# Patient Record
Sex: MALE | Race: WHITE | URBAN - METROPOLITAN AREA
[De-identification: names, ages, dates, MRNs, and addresses within clinical notes are randomized per-mention and may not be internally consistent; named-entity substitution may affect disease eponyms.]

---

## 2018-09-06 ENCOUNTER — TRANSFERRED RECORDS (OUTPATIENT)
Dept: HEALTH INFORMATION MANAGEMENT | Facility: CLINIC | Age: 83
End: 2018-09-06

## 2019-04-03 ENCOUNTER — TRANSFERRED RECORDS (OUTPATIENT)
Dept: HEALTH INFORMATION MANAGEMENT | Facility: CLINIC | Age: 84
End: 2019-04-03

## 2019-04-06 ENCOUNTER — TRANSFERRED RECORDS (OUTPATIENT)
Dept: HEALTH INFORMATION MANAGEMENT | Facility: CLINIC | Age: 84
End: 2019-04-06

## 2019-05-13 ENCOUNTER — TRANSFERRED RECORDS (OUTPATIENT)
Dept: HEALTH INFORMATION MANAGEMENT | Facility: CLINIC | Age: 84
End: 2019-05-13

## 2019-05-17 ENCOUNTER — TRANSFERRED RECORDS (OUTPATIENT)
Dept: HEALTH INFORMATION MANAGEMENT | Facility: CLINIC | Age: 84
End: 2019-05-17

## 2019-05-18 ENCOUNTER — TRANSFERRED RECORDS (OUTPATIENT)
Dept: HEALTH INFORMATION MANAGEMENT | Facility: CLINIC | Age: 84
End: 2019-05-18

## 2019-05-22 ENCOUNTER — REFERRAL (OUTPATIENT)
Dept: TRANSPLANT | Facility: CLINIC | Age: 84
End: 2019-05-22

## 2019-05-22 NOTE — LETTER
Request for Records from Referring Providers Office for Patients Referred to [facility] Solid Organ Transplant Program    May 22, 2019    Re: Harmeet Germain   [address]  [id number]                 [facility] [address]   5/26/1926           MEDICAL RECORDS REQUEST  [facility] liver transplant team is requesting records from Referring Providers Office for patients referred to the Liver Transplant Program                     Images Needed to Process Intake of Patient:    CXR Images (most recent)    Chest CT Images (all within last 24 months or most recent)    Abdominal CT Report and Images  (all within last 24 months or most recent)    Abdominal MRI Report and Images  (all within last 24 months or most recent)    Bone Scan Images and Report (No DEXA Scans)    PET Images and Report  Records Needed to Process Intake of Patient:    Cardiac Catheterization Results (most recent on file)    Chest CT Report (all within last 24 months or most recent)    Chest X-Ray Report (all within last 24 months or most recent)    Culture Results (last 2 years on file)    ECHO Results (most recent on file)    Hospital Discharge Summaries (last 2 years on file)    Lab Results (most recent on file)    History and Physical (original on file)    Liver Pathology All Reports     Physicians Notes (last 3 reports on file)    Radiology Reports (not including Chest X-ray, last 2 years on file)    EGD Images and Report     Colonoscopy Report with Pathology    Requested imaging may be electronically sent to the Perceptive Pixel imaging system via [id number] DICOM connection.   When unable to send imaging electronically, an exported DICOM CD may be sent. Please indicate when images have been sent electronically on a return faxed cover sheet.    Requested pathology slides should be accompanied by the appropriate report from your institution.    When the patient is hand carrying requested records or the requested records are  not at your facility, please indicate this information on a return faxed cover sheet.    Please fax all paper records to [phone] within 3-5 business days.    Please send all scans/slides to:   [facility]  Solid Organ Transplant Office  [address] [place] [id number]    Please call our office at [phone] if you have any questions or concerns.        .

## 2019-05-24 ENCOUNTER — DOCUMENTATION ONLY (OUTPATIENT)
Dept: TRANSPLANT | Facility: CLINIC | Age: 84
End: 2019-05-24

## 2019-05-24 NOTE — TELEPHONE ENCOUNTER
Attempted to obtain records in [place] for patients upcoming appt with no success.   Also reached out to patients referring provider Dr. Amparo Guzman with a response that they couldn't send records to [facility] d/t no DOT on file..   Will keep trying to get records.    IMELDA Leo, LPN   Solid Organ Transplant

## 2019-05-24 NOTE — TELEPHONE ENCOUNTER
Patient's Demographics  Name:  Harmeet Germain  Address: [address] [place] [address], [address], [place] [address]  Phone: [phone] (daughter's #) -- dad is hard of hearing  Main Contact: DaughterOsiris  :  26  SS#:  [place]:  patient (Medicare and [facility])  Medicare#: [id number]  BCBS#: [id number]; grp#: [id number]  REFERRING PHYSICIAN & Phone#:   Dr. Solomon Guzman - [phone]

## 2019-05-28 ENCOUNTER — OFFICE VISIT (OUTPATIENT)
Dept: TRANSPLANT | Facility: CLINIC | Age: 84
End: 2019-05-28
Attending: TRANSPLANT SURGERY
Payer: MEDICARE

## 2019-05-28 VITALS
SYSTOLIC BLOOD PRESSURE: 122 MMHG | WEIGHT: 148.2 LBS | DIASTOLIC BLOOD PRESSURE: 70 MMHG | OXYGEN SATURATION: 95 % | HEART RATE: 90 BPM

## 2019-05-28 DIAGNOSIS — C22.0 HEPATOMA (H): ICD-10-CM

## 2019-05-28 DIAGNOSIS — C22.0 HEPATOMA (H): Primary | ICD-10-CM

## 2019-05-28 LAB
AFP SERPL-MCNC: 1122.3 UG/L (ref 0–8)
ALBUMIN SERPL-MCNC: 3.3 G/DL (ref 3.4–5)
ALP SERPL-CCNC: 158 U/L (ref 40–150)
ALT SERPL W P-5'-P-CCNC: 39 U/L (ref 0–70)
ANION GAP SERPL CALCULATED.3IONS-SCNC: 8 MMOL/L (ref 3–14)
AST SERPL W P-5'-P-CCNC: 28 U/L (ref 0–45)
BILIRUB SERPL-MCNC: 0.6 MG/DL (ref 0.2–1.3)
BUN SERPL-MCNC: 22 MG/DL (ref 7–30)
CALCIUM SERPL-MCNC: 9.4 MG/DL (ref 8.5–10.1)
CHLORIDE SERPL-SCNC: 106 MMOL/L (ref 94–109)
CO2 SERPL-SCNC: 24 MMOL/L (ref 20–32)
CREAT SERPL-MCNC: 1.03 MG/DL (ref 0.66–1.25)
ERYTHROCYTE [DISTWIDTH] IN BLOOD BY AUTOMATED COUNT: 14.9 % (ref 10–15)
GFR SERPL CREATININE-BSD FRML MDRD: 62 ML/MIN/{1.73_M2}
GLUCOSE SERPL-MCNC: 84 MG/DL (ref 70–99)
HCT VFR BLD AUTO: 50.1 % (ref 40–53)
HGB BLD-MCNC: 15.9 G/DL (ref 13.3–17.7)
INR PPP: 1.16 (ref 0.86–1.14)
MCH RBC QN AUTO: 30.7 PG (ref 26.5–33)
MCHC RBC AUTO-ENTMCNC: 31.7 G/DL (ref 31.5–36.5)
MCV RBC AUTO: 97 FL (ref 78–100)
PLATELET # BLD AUTO: 306 10E9/L (ref 150–450)
POTASSIUM SERPL-SCNC: 4.2 MMOL/L (ref 3.4–5.3)
PROT SERPL-MCNC: 8 G/DL (ref 6.8–8.8)
RBC # BLD AUTO: 5.18 10E12/L (ref 4.4–5.9)
SODIUM SERPL-SCNC: 139 MMOL/L (ref 133–144)
WBC # BLD AUTO: 8.3 10E9/L (ref 4–11)

## 2019-05-28 PROCEDURE — G0463 HOSPITAL OUTPT CLINIC VISIT: HCPCS

## 2019-05-28 PROCEDURE — 82105 ALPHA-FETOPROTEIN SERUM: CPT | Performed by: TRANSPLANT SURGERY

## 2019-05-28 PROCEDURE — 36415 COLL VENOUS BLD VENIPUNCTURE: CPT | Performed by: TRANSPLANT SURGERY

## 2019-05-28 PROCEDURE — 85027 COMPLETE CBC AUTOMATED: CPT | Performed by: TRANSPLANT SURGERY

## 2019-05-28 PROCEDURE — 85610 PROTHROMBIN TIME: CPT | Performed by: TRANSPLANT SURGERY

## 2019-05-28 PROCEDURE — 80053 COMPREHEN METABOLIC PANEL: CPT | Performed by: TRANSPLANT SURGERY

## 2019-05-28 RX ORDER — DIPHENOXYLATE HYDROCHLORIDE AND ATROPINE SULFATE 2.5; .025 MG/1; MG/1
1 TABLET ORAL EVERY 24 HOURS
COMMUNITY

## 2019-05-28 RX ORDER — DIAPER,BRIEF,INFANT-TODD,DISP
EACH MISCELLANEOUS 2 TIMES DAILY
Qty: 30 G | Refills: 1 | Status: SHIPPED | OUTPATIENT
Start: 2019-05-28

## 2019-05-28 RX ORDER — DIGOXIN 125 MCG
1 TABLET ORAL
COMMUNITY

## 2019-05-28 RX ORDER — METOPROLOL TARTRATE 25 MG/1
1 TABLET, FILM COATED ORAL
COMMUNITY

## 2019-05-28 RX ORDER — TAMSULOSIN HYDROCHLORIDE 0.4 MG/1
1 CAPSULE ORAL DAILY
Refills: 2 | COMMUNITY
Start: 2019-04-27

## 2019-05-28 RX ORDER — FINASTERIDE 5 MG/1
1 TABLET, FILM COATED ORAL DAILY
Refills: 2 | COMMUNITY
Start: 2019-04-18

## 2019-05-28 ASSESSMENT — PAIN SCALES - GENERAL: PAINLEVEL: SEVERE PAIN (6)

## 2019-05-28 NOTE — PROGRESS NOTES
Chief Complaint   Patient presents with     RECHECK     Post liver tumor      Blood pressure 122/70, pulse 90, weight 67.2 kg (148 lb 3.2 oz), SpO2 95 %.    Imelda Chin, CMA

## 2019-05-28 NOTE — LETTER
5/28/2019       RE: Harmeet Germain  [address]  [address] [place] [id number]     Dear Colleague,    Thank you for referring your patient, Harmeet Germain, to the [facility] SOLID ORGAN TRANSPLANT at [facility]. Please see a copy of my visit note below.    Chief Complaint   Patient presents with     RECHECK     Post liver tumor      Blood pressure 122/70, pulse 90, weight 67.2 kg (148 lb 3.2 oz), SpO2 95 %.    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  He has been treated with TACE.  The last status was done approximately 2 weeks ago.  The plan would be to repeat an MRI and discuss him at our multidisciplinary tumor conference for further recommendations.  He has not a surgical candidate or a transplant candidate in view of multiple comorbidities.  We will also check an alpha-fetoprotein  And MRI on him        Interval History:      89-year-old gentleman who primarily lives in [place] and was diagnosed to have a ruptured hepatocellular carcinoma for which she received TACE.  The last use was done 2 weeks ago according to the patient.  He is otherwise well, no fever no loss of weight and is able to perform his daily activities.  There is no abdominal distention or any chest discomfort  ROS: He has had heart valve replacement, bladder surgery, tonsillectomy, and a previous smoker.  He also has hypertension bronchial asthma diabetes mellitus.   -Diabetic diet  -Continue home diabetes regimen  -Start sliding-scale insulin         Physical Exam:   /70   Pulse 90   Wt 67.2 kg (148 lb 3.2 oz)   SpO2 95%   Constitutional: Very frail abdomen is soft  HEENT: PERRL, no icterus  Hematologic / Lymphatic:  Lungs: Clear to auscultation  bilaterally  Cardiovascular: RRR, s1, s2  Abdomen: Soft no tenderness:   Neurologic: aox3  Skin: warm, well perfused,  rashes noted on the left arm and forearm for which I prescribed hydrocortisone cream, and recommended he see a primary care physician  Lines:       Sagar Chapin MD, SHIRA  Professor of Surgery  [employer]  Surgical Director of Liver Transplant Program  Executive Medical Director of Pediatric Transplantation      Again, thank you for allowing me to participate in the care of your patient.      Sincerely,    Sagar Chapin MD      CC  PROVIDER NOT IN SYSTEM    Copy to patient     [address] [facility]  [address] [place] [id number]        Chief Complaints and History of Present Illnesses   Patient presents wi     RECHECK     Post liver tumor

## 2019-05-28 NOTE — PROGRESS NOTES
HPI      ROS      Physical Exam    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  He has been treated with TACE.  The last status was done approximately 2 weeks ago.  The plan would be to repeat an MRI and discuss him at our multidisciplinary tumor conference for further recommendations.  He has not a surgical candidate or a transplant candidate in view of multiple comorbidities.  We will also check an alpha-fetoprotein  And MRI on him        Interval History:      89-year-old gentleman who primarily lives in [place] and was diagnosed to have a ruptured hepatocellular carcinoma for which she received TACE.  The last use was done 2 weeks ago according to the patient.  He is otherwise well, no fever no loss of weight and is able to perform his daily activities.  There is no abdominal distention or any chest discomfort  ROS: He has had heart valve replacement, bladder surgery, tonsillectomy, and a previous smoker.  He also has hypertension bronchial asthma diabetes mellitus.   -Diabetic diet  -Continue home diabetes regimen  -Start sliding-scale insulin         Physical Exam:   /70   Pulse 90   Wt 67.2 kg (148 lb 3.2 oz)   SpO2 95%   Constitutional: Very frail abdomen is soft  HEENT: PERRL, no icterus  Hematologic / Lymphatic:  Lungs: Clear to auscultation bilaterally  Cardiovascular: RRR, s1, s2  Abdomen: Soft no tenderness:   Neurologic: aox3  Skin: warm, well perfused,  rashes noted on the left arm and forearm for which I prescribed hydrocortisone cream, and recommended he see a primary care physician  Lines:       Sagar Chapin MD, SHIRA  Professor of Surgery  [employer]  Surgical Director of Liver Transplant Program  Executive Medical Director of Pediatric Transplantation  CC  PROVIDER NOT IN SYSTEM    Copy to  patient     [address]  [address] [place] [id number]        Chief Complaints and History of Present Illnesses   Patient presents wi     RECHECK     Post liver tumor

## 2019-05-30 ENCOUNTER — ANCILLARY PROCEDURE (OUTPATIENT)
Dept: MRI IMAGING | Facility: CLINIC | Age: 84
End: 2019-05-30
Attending: TRANSPLANT SURGERY
Payer: MEDICARE

## 2019-05-30 DIAGNOSIS — C22.0 HEPATOMA (H): ICD-10-CM

## 2019-05-30 RX ORDER — GADOBUTROL 604.72 MG/ML
7.5 INJECTION INTRAVENOUS ONCE
Status: COMPLETED | OUTPATIENT
Start: 2019-05-30 | End: 2019-05-30

## 2019-05-30 RX ADMIN — GADOBUTROL 7.5 ML: 604.72 INJECTION INTRAVENOUS at 16:42

## 2019-06-11 ENCOUNTER — TELEPHONE (OUTPATIENT)
Dept: TRANSPLANT | Facility: CLINIC | Age: 84
End: 2019-06-11

## 2019-06-11 NOTE — TELEPHONE ENCOUNTER
The patient's clinical details were discussed in the tumor conference.  The recommendation is for him to see interventional radiology for possible liver directed therapy  the alpha-fetoprotein is about 1200    I called the patient's daughter and explained the above plan I requested her to get an appointment with Dr.Shamar Martinez who was willing to see the patient and provide recommendation therapy

## 2019-06-12 NOTE — TELEPHONE ENCOUNTER
FUTURE VISIT INFORMATION      FUTURE VISIT INFORMATION:    Date: 6.17    Time: 3p    Location: Vascular  REFERRAL INFORMATION:    Referring provider:  Sagar Chapin MD    Referring providers clinic:  [facility]    Reason for visit/diagnosis  Liver treatment    RECORDS REQUESTED FROM:       Clinic name Comments Records Status Imaging Status   Internal 6.11.19 Referral  5.28.19 Clinic note  5.30.19 MR Liver [facility]  Phone: [phone] 4/2019-5/2019 Clinic Note, Labs  5.13.19 CT  4.6.19 MRI Scanned to Epic Requested                             6.12.19 LVM/ Imaging requested

## 2019-06-17 ENCOUNTER — OFFICE VISIT (OUTPATIENT)
Dept: VASCULAR SURGERY | Facility: CLINIC | Age: 84
End: 2019-06-17
Payer: MEDICARE

## 2019-06-17 ENCOUNTER — PRE VISIT (OUTPATIENT)
Dept: VASCULAR SURGERY | Facility: CLINIC | Age: 84
End: 2019-06-17

## 2019-06-17 VITALS — OXYGEN SATURATION: 95 % | SYSTOLIC BLOOD PRESSURE: 159 MMHG | DIASTOLIC BLOOD PRESSURE: 78 MMHG | HEART RATE: 60 BPM

## 2019-06-17 DIAGNOSIS — C22.0 HEPATOCELLULAR CARCINOMA (H): Primary | ICD-10-CM

## 2019-06-17 SDOH — HEALTH STABILITY: MENTAL HEALTH: HOW OFTEN DO YOU HAVE A DRINK CONTAINING ALCOHOL?: 2-3 TIMES A WEEK

## 2019-06-17 SDOH — HEALTH STABILITY: MENTAL HEALTH: HOW MANY STANDARD DRINKS CONTAINING ALCOHOL DO YOU HAVE ON A TYPICAL DAY?: 1 OR 2

## 2019-06-17 ASSESSMENT — PAIN SCALES - GENERAL: PAINLEVEL: NO PAIN (0)

## 2019-06-17 NOTE — NURSING NOTE
Vascular Rooming Note     Harmeet Germain's goals for this visit include:   Chief Complaint   Patient presents with     Consult     Harmeet, is being seen for a consult for liver therapy,  had liver embolization 4 weeks ago, feeling good, as reported by patient.      Christie Stoddard LPN

## 2019-06-17 NOTE — LETTER
2019       RE: Harmeet Germain  [address]  [address] [place] [id number]     Dear Colleague,    Thank you for referring your patient, Harmeet Germain, to the [facility] Please see a copy of my visit note below.        INTERVENTIONAL RADIOLOGY CONSULTATION    Name: Harmeet Germain  Age: 89 year old   Referring Physician: Dr. Chapin   REASON FOR REFERRAL: HCC     HPI: Mr. Germain is a very pleasant 89 year patient with HCC.  He presented with a rupture on 9/10/18 which was treated with embolization by Dr. Green at [facility] in [place].  This was then treated again with TACE approximately 2 weeks ago again by Dr. Green.  He has no new complaints.  He reports that he did not have much pain or evidence of PES following his last treatment.  He presents today because his daughter, who he stays with at times, has a summer home in the [place] area.  He primarily lives in [place], but also spends time in [place] with his son.  Prior to the rupture he was unaware of any liver issues.  He remains very active and is planning to visit his son in [place] in a week or two and stay about a month.  He then plans to return to [place] for some important activities which occur at the end of July begining of August time period.     PAST MEDICAL HISTORY:   No past medical history on file.    PAST SURGICAL HISTORY:   No past surgical history on file.    FAMILY HISTORY:   No family history on file.    SOCIAL HISTORY:   Social History     Tobacco Use     Smoking status: Former Smoker     Last attempt to quit:      Years since quittin.5     Smokeless tobacco: Never Used   Substance Use Topics     Alcohol use: Yes     Alcohol/week: 6.6 oz     Types: 7 Cans of beer, 4 Glasses of wine per week     Frequency: 2-3 times a week     Drinks per session: 1 or 2       PROBLEM LIST:   There are no active problems to display for this  patient.      MEDICATIONS:   Prescription Medications as of 6/27/2019       Rx Number Disp Refills Start End Last Dispensed Date Next Fill Date Owning Pharmacy    aspirin (ASPIRIN) 81 MG EC tablet            Sig: Take 1 tablet by mouth every 24 hours    Class: Historical    Route: Oral    digoxin (LANOXIN) 125 MCG tablet            Sig: Take 1 tablet by mouth    Class: Historical    Route: Oral    finasteride (PROSCAR) 5 MG tablet   2 4/18/2019        Sig: Take 1 tablet by mouth daily    Class: Historical    Route: Oral    hydrocortisone (CORTAID) 0.5 % external cream  30 g 1 5/28/2019    [place], [place] - [address] [place] [address] [phone]    Sig: Apply topically 2 times daily    Class: E-Prescribe    Route: Topical    metoprolol tartrate (LOPRESSOR) 25 MG tablet            Sig: Take 1 tablet by mouth 2 times daily    Class: Historical    Route: Oral    Multiple Vitamin (MULTI-VITAMINS) TABS            Sig: Take 1 tablet by mouth every 24 hours    Class: Historical    Route: Oral    omeprazole (PRILOSEC) 20 MG DR capsule   0 3/29/2019        Sig: Take 1 mg by mouth 2 times daily    Class: Historical    Route: Oral    tamsulosin (FLOMAX) 0.4 MG capsule   2 4/27/2019        Sig: Take 1 capsule by mouth daily    Class: Historical    Route: Oral          ALLERGIES:   Penicillins    ROS:    Physical Examination:   VITALS:   /78 (BP Location: Left arm, Patient Position: Chair, Cuff Size: Adult Regular)   Pulse 60   SpO2 95%   Constitutional: healthy, alert and no distress  Head: Normocephalic.   Cardiovascular: negative, PMI normal. No lifts, heaves, or thrills. RRR. No murmurs, clicks gallops or rub  Respiratory: negative, Percussion normal. Good diaphragmatic excursion. Lungs clear  Gastrointestinal: negative, Abdomen soft, non-tender. BS normal. No masses, organomegaly  Psychiatric: affect normal/bright and mentation appears normal.    Labs:    BMP RESULTS:  Lab Results    Component Value Date     05/28/2019    POTASSIUM 4.2 05/28/2019    CHLORIDE 106 05/28/2019    CO2 24 05/28/2019    ANIONGAP 8 05/28/2019    GLC 84 05/28/2019    BUN 22 05/28/2019    CR 1.03 05/28/2019    GFRESTIMATED 62 05/28/2019    GFRESTBLACK 72 05/28/2019    GEORGIA 9.4 05/28/2019        CBC RESULTS:  Lab Results   Component Value Date    WBC 8.3 05/28/2019    RBC 5.18 05/28/2019    HGB 15.9 05/28/2019    HCT 50.1 05/28/2019    MCV 97 05/28/2019    MCH 30.7 05/28/2019    MCHC 31.7 05/28/2019    RDW 14.9 05/28/2019     05/28/2019       INR/PTT:  Lab Results   Component Value Date    INR 1.16 (H) 05/28/2019       Diagnostic studies: I reviewed her MRI from 5/30/19 which demonstrates some likely residual disease in the region of the segment 6/7 lesion    Assessment Mr. Germain is a very pleasant 89 year old with a ruptured HCC s/p treatment x2 by Dr. Green at [facility] in [place].  He has evidence of residual disease in this lesion.  Mr. Germain reports that he would strongly prefer to defer treatment until August of September of this year as he has important summer plans.  We discussed that this course would leave a possibility of disease progression, however, it was likely not a high probability.  He also expressed a desire to return to be treated by Dr. Green, which makes sense given his good established relationship with Dr. Green and his team.  Plan I will touch bas with Dr. Green to let him know the plan.  I assume his team will help coordinate a repeat MRI in August (closer to the time Mr. Germain would like to pursue further therapy), however, would be more than happy to help coordinate this as well.  Mr. Germain his Daughter and Son-in-law all expressed understanding of the plan and associated risks and benefits and confirmed it was there preferred path of treatment.    A total of 45 minutes was spent in care for the patient, of which >50% was spent in counseling and co-ordination  of care.    CC  Patient Care Team:  No Ref-Primary, Physician as PCP - General  JASWINDER STEVENSON    Again, thank you for allowing me to participate in the care of your patient.      Sincerely,    Zev Martinez MD

## 2019-06-27 NOTE — PROGRESS NOTES
INTERVENTIONAL RADIOLOGY CONSULTATION    Name: Harmeet Germain  Age: 89 year old   Referring Physician: Dr. Chapin   REASON FOR REFERRAL: HCC     HPI: Mr. Germain is a very pleasant 89 year patient with HCC.  He presented with a rupture on 9/10/18 which was treated with embolization by Dr. Green at [facility] in [place].  This was then treated again with TACE approximately 2 weeks ago again by Dr. Green.  He has no new complaints.  He reports that he did not have much pain or evidence of PES following his last treatment.  He presents today because his daughter, who he stays with at times, has a summer home in the [place] area.  He primarily lives in [place], but also spends time in [place] with his son.  Prior to the rupture he was unaware of any liver issues.  He remains very active and is planning to visit his son in [place] in a week or two and stay about a month.  He then plans to return to [place] for some important activities which occur at the end of July begining of August time period.     PAST MEDICAL HISTORY:   No past medical history on file.    PAST SURGICAL HISTORY:   No past surgical history on file.    FAMILY HISTORY:   No family history on file.    SOCIAL HISTORY:   Social History     Tobacco Use     Smoking status: Former Smoker     Last attempt to quit:      Years since quittin.5     Smokeless tobacco: Never Used   Substance Use Topics     Alcohol use: Yes     Alcohol/week: 6.6 oz     Types: 7 Cans of beer, 4 Glasses of wine per week     Frequency: 2-3 times a week     Drinks per session: 1 or 2       PROBLEM LIST:   There are no active problems to display for this patient.      MEDICATIONS:   Prescription Medications as of 2019       Rx Number Disp Refills Start End Last Dispensed Date Next Fill Date Owning Pharmacy    aspirin (ASPIRIN) 81 MG EC tablet            Sig: Take 1 tablet by mouth every 24 hours    Class: Historical    Route: Oral    digoxin  (LANOXIN) 125 MCG tablet            Sig: Take 1 tablet by mouth    Class: Historical    Route: Oral    finasteride (PROSCAR) 5 MG tablet   2 4/18/2019        Sig: Take 1 tablet by mouth daily    Class: Historical    Route: Oral    hydrocortisone (CORTAID) 0.5 % external cream  30 g 1 5/28/2019    [phone]    Sig: Apply topically 2 times daily    Class: E-Prescribe    Route: Topical    metoprolol tartrate (LOPRESSOR) 25 MG tablet            Sig: Take 1 tablet by mouth 2 times daily    Class: Historical    Route: Oral    Multiple Vitamin (MULTI-VITAMINS) TABS            Sig: Take 1 tablet by mouth every 24 hours    Class: Historical    Route: Oral    omeprazole (PRILOSEC) 20 MG DR capsule   0 3/29/2019        Sig: Take 1 mg by mouth 2 times daily    Class: Historical    Route: Oral    tamsulosin (FLOMAX) 0.4 MG capsule   2 4/27/2019        Sig: Take 1 capsule by mouth daily    Class: Historical    Route: Oral          ALLERGIES:   Penicillins    ROS:        Physical Examination:   VITALS:   /78 (BP Location: Left arm, Patient Position: Chair, Cuff Size: Adult Regular)   Pulse 60   SpO2 95%   Constitutional: healthy, alert and no distress  Head: Normocephalic.   Cardiovascular: negative, PMI normal. No lifts, heaves, or thrills. RRR. No murmurs, clicks gallops or rub  Respiratory: negative, Percussion normal. Good diaphragmatic excursion. Lungs clear  Gastrointestinal: negative, Abdomen soft, non-tender. BS normal. No masses, organomegaly  Psychiatric: affect normal/bright and mentation appears normal.    Labs:    BMP RESULTS:  Lab Results   Component Value Date     05/28/2019    POTASSIUM 4.2 05/28/2019    CHLORIDE 106 05/28/2019    CO2 24 05/28/2019    ANIONGAP 8 05/28/2019    GLC 84 05/28/2019    BUN 22 05/28/2019    CR 1.03 05/28/2019    GFRESTIMATED 62 05/28/2019    GFRESTBLACK 72 05/28/2019    GEORGIA 9.4 05/28/2019        CBC  RESULTS:  Lab Results   Component Value Date    WBC 8.3 05/28/2019    RBC 5.18 05/28/2019    HGB 15.9 05/28/2019    HCT 50.1 05/28/2019    MCV 97 05/28/2019    MCH 30.7 05/28/2019    MCHC 31.7 05/28/2019    RDW 14.9 05/28/2019     05/28/2019       INR/PTT:  Lab Results   Component Value Date    INR 1.16 (H) 05/28/2019       Diagnostic studies: I reviewed her MRI from 5/30/19 which demonstrates some likely residual disease in the region of the segment 6/7 lesion    Assessment Mr. Germain is a very pleasant 89 year old with a ruptured HCC s/p treatment x2 by Dr. Green at [facility] in [place].  He has evidence of residual disease in this lesion.  Mr. Germain reports that he would strongly prefer to defer treatment until August of September of this year as he has important summer plans.  We discussed that this course would leave a possibility of disease progression, however, it was likely not a high probability.  He also expressed a desire to return to be treated by Dr. Green, which makes sense given his good established relationship with Dr. Green and his team.  Plan I will touch bas with Dr. Green to let him know the plan.  I assume his team will help coordinate a repeat MRI in August (closer to the time Mr. Germain would like to pursue further therapy), however, would be more than happy to help coordinate this as well.  Mr. Germain his Daughter and Son-in-law all expressed understanding of the plan and associated risks and benefits and confirmed it was there preferred path of treatment.    A total of 45 minutes was spent in care for the patient, of which >50% was spent in counseling and co-ordination of care.        CC  Patient Care Team:  No Ref-Primary, Physician as PCP - JASWINDER Patel

## 2020-06-05 ENCOUNTER — TELEPHONE (OUTPATIENT)
Dept: INTERVENTIONAL RADIOLOGY/VASCULAR | Facility: CLINIC | Age: 85
End: 2020-06-05

## 2020-06-05 NOTE — TELEPHONE ENCOUNTER
[facility] Call Center    Phone Message    May a detailed message be left on voicemail: yes     Reason for Call: Other: Patient is due for a MRI in [place] and the order has not been faxed yet. It needs to be faxed to [facility] in [place], [place]. The phone number is [phone]. The daughter did not have the fax number. If you have questiong please call Osiris at [phone]     Action Taken: Other: vascular    Travel Screening: Not Applicable

## 2020-06-05 NOTE — TELEPHONE ENCOUNTER
Called daughter Osiris to follow up on her msg.    Inquired on this request as pt has not seen us for quite some time    She states that the msg should've gone to Anton and not to us.    Informed her that will disregard this request then    She agrees to plan.     Alondra VELASQUEZ RN, BSN  Interventional Radiology Nurse Coordinator   Phone: [phone]